# Patient Record
Sex: MALE
[De-identification: names, ages, dates, MRNs, and addresses within clinical notes are randomized per-mention and may not be internally consistent; named-entity substitution may affect disease eponyms.]

---

## 2022-12-20 ENCOUNTER — TRANSCRIPTION ENCOUNTER (OUTPATIENT)
Age: 37
End: 2022-12-20

## 2022-12-20 ENCOUNTER — APPOINTMENT (OUTPATIENT)
Dept: PULMONOLOGY | Facility: CLINIC | Age: 37
End: 2022-12-20

## 2022-12-20 VITALS
OXYGEN SATURATION: 97 % | SYSTOLIC BLOOD PRESSURE: 130 MMHG | TEMPERATURE: 97.9 F | DIASTOLIC BLOOD PRESSURE: 80 MMHG | HEART RATE: 58 BPM | HEIGHT: 69 IN | WEIGHT: 180 LBS | BODY MASS INDEX: 26.66 KG/M2

## 2022-12-20 DIAGNOSIS — Z72.820 SLEEP DEPRIVATION: ICD-10-CM

## 2022-12-20 DIAGNOSIS — Z78.9 OTHER SPECIFIED HEALTH STATUS: ICD-10-CM

## 2022-12-20 DIAGNOSIS — Z83.438 FAMILY HISTORY OF OTHER DISORDER OF LIPOPROTEIN METABOLISM AND OTHER LIPIDEMIA: ICD-10-CM

## 2022-12-20 DIAGNOSIS — J30.9 ALLERGIC RHINITIS, UNSPECIFIED: ICD-10-CM

## 2022-12-20 DIAGNOSIS — Z82.49 FAMILY HISTORY OF ISCHEMIC HEART DISEASE AND OTHER DISEASES OF THE CIRCULATORY SYSTEM: ICD-10-CM

## 2022-12-20 DIAGNOSIS — Z82.69 FAMILY HISTORY OF OTHER DISEASES OF THE MUSCULOSKELETAL SYSTEM AND CONNECTIVE TISSUE: ICD-10-CM

## 2022-12-20 DIAGNOSIS — R05.9 COUGH, UNSPECIFIED: ICD-10-CM

## 2022-12-20 DIAGNOSIS — R93.89 ABNORMAL FINDINGS ON DIAGNOSTIC IMAGING OF OTHER SPECIFIED BODY STRUCTURES: ICD-10-CM

## 2022-12-20 PROBLEM — Z00.00 ENCOUNTER FOR PREVENTIVE HEALTH EXAMINATION: Status: ACTIVE | Noted: 2022-12-20

## 2022-12-20 PROCEDURE — 94729 DIFFUSING CAPACITY: CPT

## 2022-12-20 PROCEDURE — 94010 BREATHING CAPACITY TEST: CPT

## 2022-12-20 PROCEDURE — 95012 NITRIC OXIDE EXP GAS DETER: CPT

## 2022-12-20 PROCEDURE — 94727 GAS DIL/WSHOT DETER LNG VOL: CPT

## 2022-12-20 PROCEDURE — ZZZZZ: CPT

## 2022-12-20 PROCEDURE — 71046 X-RAY EXAM CHEST 2 VIEWS: CPT

## 2022-12-20 PROCEDURE — 99204 OFFICE O/P NEW MOD 45 MIN: CPT | Mod: 25

## 2022-12-20 RX ORDER — ALBUTEROL SULFATE 90 UG/1
108 (90 BASE) INHALANT RESPIRATORY (INHALATION)
Qty: 1 | Refills: 2 | Status: ACTIVE | COMMUNITY
Start: 2022-12-20 | End: 1900-01-01

## 2022-12-20 RX ORDER — AZELASTINE HYDROCHLORIDE 137 UG/1
0.1 SPRAY, METERED NASAL TWICE DAILY
Qty: 1 | Refills: 1 | Status: ACTIVE | COMMUNITY
Start: 2022-12-20 | End: 1900-01-01

## 2022-12-23 RX ORDER — BUDESONIDE AND FORMOTEROL FUMARATE DIHYDRATE 160; 4.5 UG/1; UG/1
160-4.5 AEROSOL RESPIRATORY (INHALATION) TWICE DAILY
Qty: 1 | Refills: 1 | Status: ACTIVE | COMMUNITY
Start: 2022-12-20 | End: 1900-01-01

## 2022-12-30 NOTE — PROCEDURE
[FreeTextEntry1] : PFT'S performed in office show minimal obstructive lung defect. Lung volumes and diffusion capacity is within normal limits. \par FEV: 116\par FEV1/FVC Ratio: 80\par GOK98-18%: 104\par DLCO: 101\par \par FENO: 13 ppb\par \par CXR in office;Ready by Dr. Jimenez\par Impression: Normal appearing heart. Prominent bilateral hilum and hyperinflation  noted. \par \par

## 2022-12-30 NOTE — HISTORY OF PRESENT ILLNESS
[TextBox_4] : Mr. Levy is a 37-year-old male with no significant medical history presents to the office for initial pulmonary evaluation. He is accompanied by his wife. \par \par His chief concern is cough.\par \par Patient reports for past 2-3 years, strong fragrances, house  and cold weather triggers cough. He states around Thanksgiving he had URI and had dry cough which resolved few days ago. Patient reports he has been using albuterol inhaler with some relief in cough. \par Patient reports he still feels some restriction in his breathing, especially when he takes deep breaths. \par \par Patient reports he has occasional nasal congestion and postnasal drip but does not use any nasal sprays.\par \par Patient admits to mild snoring and nonrestorative sleep.\par \par Patient reports he is COVID vaccinated and boosted X 2. He reports COVID infection 3/2020 with severe symptoms and again 6/2022 with mils symptoms. \par \par Patient denies fever, chills, SOB @ rest or exertion, cough, wheezing, nasal congestion, runny nose or heartburn/reflux.\par \par

## 2022-12-30 NOTE — REASON FOR VISIT
[Initial] : an initial visit [Cough] : cough [Spouse] : spouse [Sleep Evaluation] : sleep evaluation

## 2022-12-30 NOTE — ASSESSMENT
[FreeTextEntry1] : Mr. Levy is a 37-year-old male with no significant medical history presents to the office for initial pulmonary evaluation. He is accompanied by his wife. \par \par His chief concern is cough.\par \par 1. Cough\par -r/t post viral cough vs RAD\par -continue albuterol 2 puffs Q6H\par -add Symbicort 160 2 puffs BID: rinse & gargle after use\par -add Methacholine Challenge Test\par \par 2. Nasal congestion/PND\par -add azelastine nasal spray 2 spray BID\par \par 3. Abnormal CXR\par -add Chest CT (list of [facility] imaging centers given to patient)\par \par 4. Poor sleep\par -add HST\par \par Patient to follow up in 1 month with Dr. Jimenez.\par Patient to call with further questions and concerns.\par Patient verbalizes understanding of care plan and is agreeable.\par

## 2023-01-10 ENCOUNTER — APPOINTMENT (OUTPATIENT)
Dept: CT IMAGING | Facility: IMAGING CENTER | Age: 38
End: 2023-01-10
Payer: COMMERCIAL

## 2023-01-10 ENCOUNTER — OUTPATIENT (OUTPATIENT)
Dept: OUTPATIENT SERVICES | Facility: HOSPITAL | Age: 38
LOS: 1 days | End: 2023-01-10
Payer: COMMERCIAL

## 2023-01-10 DIAGNOSIS — R93.89 ABNORMAL FINDINGS ON DIAGNOSTIC IMAGING OF OTHER SPECIFIED BODY STRUCTURES: ICD-10-CM

## 2023-01-10 PROCEDURE — 71250 CT THORAX DX C-: CPT

## 2023-01-10 PROCEDURE — 71250 CT THORAX DX C-: CPT | Mod: 26

## 2023-01-19 ENCOUNTER — NON-APPOINTMENT (OUTPATIENT)
Age: 38
End: 2023-01-19

## 2023-01-23 ENCOUNTER — TRANSCRIPTION ENCOUNTER (OUTPATIENT)
Age: 38
End: 2023-01-23

## 2024-12-31 ENCOUNTER — APPOINTMENT (OUTPATIENT)
Dept: FAMILY MEDICINE | Facility: CLINIC | Age: 39
End: 2024-12-31

## 2025-01-06 ENCOUNTER — APPOINTMENT (OUTPATIENT)
Dept: FAMILY MEDICINE | Facility: CLINIC | Age: 40
End: 2025-01-06
Payer: COMMERCIAL

## 2025-01-06 VITALS
SYSTOLIC BLOOD PRESSURE: 120 MMHG | HEART RATE: 67 BPM | DIASTOLIC BLOOD PRESSURE: 78 MMHG | OXYGEN SATURATION: 98 % | BODY MASS INDEX: 28.44 KG/M2 | TEMPERATURE: 97.3 F | WEIGHT: 192 LBS | HEIGHT: 69 IN

## 2025-01-06 DIAGNOSIS — Z80.42 FAMILY HISTORY OF MALIGNANT NEOPLASM OF PROSTATE: ICD-10-CM

## 2025-01-06 DIAGNOSIS — Z00.00 ENCOUNTER FOR GENERAL ADULT MEDICAL EXAMINATION W/OUT ABNORMAL FINDINGS: ICD-10-CM

## 2025-01-06 DIAGNOSIS — H92.02 OTALGIA, LEFT EAR: ICD-10-CM

## 2025-01-06 DIAGNOSIS — R73.03 PREDIABETES.: ICD-10-CM

## 2025-01-06 DIAGNOSIS — R35.0 FREQUENCY OF MICTURITION: ICD-10-CM

## 2025-01-06 PROCEDURE — 99386 PREV VISIT NEW AGE 40-64: CPT

## 2025-01-06 PROCEDURE — G2211 COMPLEX E/M VISIT ADD ON: CPT | Mod: NC

## 2025-01-06 PROCEDURE — 99204 OFFICE O/P NEW MOD 45 MIN: CPT | Mod: 25

## 2025-01-06 RX ORDER — NEOMYCIN AND POLYMYXIN B SULFATES AND HYDROCORTISONE OTIC 10; 3.5; 1 MG/ML; MG/ML; [USP'U]/ML
3.5-10000-1 SUSPENSION AURICULAR (OTIC) 4 TIMES DAILY
Qty: 1 | Refills: 0 | Status: ACTIVE | COMMUNITY
Start: 2025-01-06 | End: 1900-01-01

## 2025-01-17 LAB — HBA1C MFR BLD HPLC: 5.6
